# Patient Record
Sex: MALE | Race: OTHER | URBAN - METROPOLITAN AREA
[De-identification: names, ages, dates, MRNs, and addresses within clinical notes are randomized per-mention and may not be internally consistent; named-entity substitution may affect disease eponyms.]

---

## 2017-01-25 ENCOUNTER — OFFICE VISIT (OUTPATIENT)
Dept: INTERNAL MEDICINE CLINIC | Facility: CLINIC | Age: 42
End: 2017-01-25

## 2017-01-25 VITALS
TEMPERATURE: 98 F | SYSTOLIC BLOOD PRESSURE: 120 MMHG | HEART RATE: 64 BPM | BODY MASS INDEX: 28.02 KG/M2 | DIASTOLIC BLOOD PRESSURE: 80 MMHG | WEIGHT: 242.19 LBS | HEIGHT: 78 IN

## 2017-01-25 DIAGNOSIS — I10 ESSENTIAL HYPERTENSION: ICD-10-CM

## 2017-01-25 DIAGNOSIS — Z12.5 SCREENING PSA (PROSTATE SPECIFIC ANTIGEN): ICD-10-CM

## 2017-01-25 DIAGNOSIS — L21.9 SEBORRHEIC DERMATITIS: ICD-10-CM

## 2017-01-25 DIAGNOSIS — Z00.00 PHYSICAL EXAM, ANNUAL: Primary | ICD-10-CM

## 2017-01-25 PROCEDURE — 99396 PREV VISIT EST AGE 40-64: CPT | Performed by: INTERNAL MEDICINE

## 2017-01-25 RX ORDER — METOPROLOL SUCCINATE 50 MG/1
50 TABLET, EXTENDED RELEASE ORAL DAILY
Qty: 30 TABLET | Refills: 1 | Status: SHIPPED | OUTPATIENT
Start: 2017-01-25 | End: 2017-11-22

## 2017-01-25 RX ORDER — METOPROLOL SUCCINATE 50 MG/1
TABLET, EXTENDED RELEASE ORAL
Qty: 90 TABLET | Refills: 3 | Status: SHIPPED | OUTPATIENT
Start: 2017-01-25 | End: 2017-11-22

## 2017-01-26 NOTE — PROGRESS NOTES
Favian Baker is a 37year old male.     HPI:   Patient presents with:  Physical  Medication Request: pending for mail order , would everardo rogers fill for local pharmacy      36 y/o M here for physical exam;  no CP; no SOB; no headaches; no palpitation; uses decreased appetite, diarrhea and vomiting; no melena or hematochezia  Musculoskeletal:  Negative for arthralgias or myalgias  Genitourinary:  Negative for dysuria or polyuria  Hema/Lymph:  Negative for easy bleeding and easy bruising  Integumentary:  Negat 24 Hr 30 tablet 1      Sig: Take 1 tablet (50 mg total) by mouth daily.            Imaging & Referrals:  None     1/25/2017  Essence Parker MD

## 2017-11-22 ENCOUNTER — TELEPHONE (OUTPATIENT)
Dept: INTERNAL MEDICINE CLINIC | Facility: CLINIC | Age: 42
End: 2017-11-22

## 2017-11-22 RX ORDER — METOPROLOL SUCCINATE 50 MG/1
50 TABLET, EXTENDED RELEASE ORAL DAILY
Qty: 30 TABLET | Refills: 1 | Status: SHIPPED | OUTPATIENT
Start: 2017-11-22 | End: 2018-01-29

## 2017-11-22 RX ORDER — METOPROLOL SUCCINATE 50 MG/1
TABLET, EXTENDED RELEASE ORAL
Qty: 90 TABLET | Refills: 0 | Status: SHIPPED | OUTPATIENT
Start: 2017-11-22

## 2017-11-22 NOTE — TELEPHONE ENCOUNTER
Spoke with patient. He is requesting a 1yr supply of Metoprolol (#90 3 RF) to be sent to [email or web address] as well as a short term supply of #30 sent to his local [facility] pharmacy in [place] since he is out of meds.  He is no longer using [facility] which is wher

## 2017-11-22 NOTE — TELEPHONE ENCOUNTER
Pt is out of meds and he is asking for a short term to be filled at [facility]    Pt asking for a one month supply -     Pharmacy Phone # [phone]    Metoprolol Succinate ER 50 MG Oral     Also asking for the 90 day

## 2017-11-23 NOTE — TELEPHONE ENCOUNTER
Refilled metoprolol ER 50 mg po qD #30, 1RF to local [facility] in [place], [place] and metoprolol ER 50 mg qD #90, 0RF to [facility]; would encourage pt to establish with local physician office as labs and exam due early next year; please call pt; ERx sent

## 2018-01-15 ENCOUNTER — TELEPHONE (OUTPATIENT)
Dept: INTERNAL MEDICINE CLINIC | Facility: CLINIC | Age: 43
End: 2018-01-15

## 2018-01-15 RX ORDER — METOPROLOL SUCCINATE 50 MG/1
TABLET, EXTENDED RELEASE ORAL
Qty: 90 TABLET | OUTPATIENT
Start: 2018-01-15

## 2018-01-15 NOTE — TELEPHONE ENCOUNTER
See encounter 11/22/17. Patient moved out of state to [place]. Last physical 1/25/17. On 11/22/17 Dr Melissa Spain gave short supply of 30 tablets and 1 refill to local pharmacy and at the same time 90 day supply was sent to [facility].  Patient should still have medicatio

## 2018-01-29 RX ORDER — METOPROLOL SUCCINATE 50 MG/1
50 TABLET, EXTENDED RELEASE ORAL DAILY
Qty: 90 TABLET | Refills: 0 | Status: SHIPPED | OUTPATIENT
Start: 2018-01-29

## 2018-01-29 NOTE — TELEPHONE ENCOUNTER
Patient has been in [place] since July & doesn't have a new doctor appt till April. His insurance runs out on 2/1. Patient is aware that this refill was denied, but he thought he would still try to have us give him another mail away quantity.   Patient has a

## 2018-01-29 NOTE — TELEPHONE ENCOUNTER
To Dr Stephan Pack to please review message below and advise on refill request for metoprolol succinate

## 2018-01-30 NOTE — TELEPHONE ENCOUNTER
Refilled metoprolol ER 50 mg po qD #90, no RF; ERx sent to [facility]; all subsequent refills will need to come from office of new physician; please notify pt Cell [phone]

## (undated) NOTE — MR AVS SNAPSHOT
[facility] [address] [facility] [id number]  [phone]               Thank you for choosing us for your health care visit with Malissa Stoll MD.  We are glad to serve you and happy to provide you with this summary of your visit.   Ple medications prescribed for you. Read the directions carefully, and ask your doctor or other care provider to review them with you.          Where to Get Your Medications      These medications were sent to [address] [facility] [place], [address]  [facility] For medical emergencies, dial 911. Educational Information     Healthy Diet and Regular Exercise  [employer] for making healthy food choices  -   Enjoy your food, but eat less. Fully enjoy your food when eating.    Don’t